# Patient Record
Sex: MALE | Race: WHITE | NOT HISPANIC OR LATINO | Employment: UNEMPLOYED | URBAN - METROPOLITAN AREA
[De-identification: names, ages, dates, MRNs, and addresses within clinical notes are randomized per-mention and may not be internally consistent; named-entity substitution may affect disease eponyms.]

---

## 2022-01-15 ENCOUNTER — OFFICE VISIT (OUTPATIENT)
Dept: URGENT CARE | Facility: PHYSICIAN GROUP | Age: 37
End: 2022-01-15

## 2022-01-15 VITALS
HEART RATE: 77 BPM | HEIGHT: 66 IN | BODY MASS INDEX: 27.29 KG/M2 | RESPIRATION RATE: 20 BRPM | DIASTOLIC BLOOD PRESSURE: 70 MMHG | WEIGHT: 169.8 LBS | TEMPERATURE: 98.1 F | SYSTOLIC BLOOD PRESSURE: 122 MMHG | OXYGEN SATURATION: 96 %

## 2022-01-15 DIAGNOSIS — N50.811 PAIN IN BOTH TESTICLES: ICD-10-CM

## 2022-01-15 DIAGNOSIS — N50.812 PAIN IN BOTH TESTICLES: ICD-10-CM

## 2022-01-15 LAB
APPEARANCE UR: CLEAR
BILIRUB UR STRIP-MCNC: NEGATIVE MG/DL
COLOR UR AUTO: NORMAL
GLUCOSE UR STRIP.AUTO-MCNC: 100 MG/DL
KETONES UR STRIP.AUTO-MCNC: 15 MG/DL
LEUKOCYTE ESTERASE UR QL STRIP.AUTO: NEGATIVE
NITRITE UR QL STRIP.AUTO: POSITIVE
PH UR STRIP.AUTO: 5 [PH] (ref 5–8)
PROT UR QL STRIP: 30 MG/DL
RBC UR QL AUTO: NORMAL
SP GR UR STRIP.AUTO: 1.03
UROBILINOGEN UR STRIP-MCNC: 1 MG/DL

## 2022-01-15 PROCEDURE — 99203 OFFICE O/P NEW LOW 30 MIN: CPT | Performed by: PHYSICIAN ASSISTANT

## 2022-01-15 PROCEDURE — 81002 URINALYSIS NONAUTO W/O SCOPE: CPT | Performed by: PHYSICIAN ASSISTANT

## 2022-01-16 ASSESSMENT — ENCOUNTER SYMPTOMS
EYE PAIN: 0
COUGH: 0
NAUSEA: 0
CHILLS: 0
CONSTIPATION: 0
DIARRHEA: 0
HEADACHES: 0
SORE THROAT: 0
SHORTNESS OF BREATH: 0
VOMITING: 0
MYALGIAS: 0
ABDOMINAL PAIN: 0
FEVER: 0

## 2022-01-16 NOTE — PROGRESS NOTES
"Subjective:   Jorge Spears is a 36 y.o. male who presents for Pain (testicle x3 weeks)      This is a 36-year-old male who reports that around 3 weeks ago he was seen in outside clinic and was diagnosed with a urinary tract infection.  At that time was experiencing dysuria, frequency and urgency.  He was not experiencing any penile discharge.  Unclear whether STD testing was performed at that time.  He was treated with ciprofloxacin twice daily for 7 days and had improvement of his lower urinary symptoms.  However his very subtle testicular pain/scrotal pain has increased over the last 3 weeks.  Seems to be worse after running and brisk physical activity.  Occasionally is left-sided, occasionally right-sided.  He is also noticed waxing and waning scrotal swelling.  He is not experiencing gastrointestinal symptoms.  He has not noted any fever or chills.  As before no rash, lesions, penile discharge.  No concerns of STDs.      Review of Systems   Constitutional: Negative for chills and fever.   HENT: Negative for congestion, ear pain and sore throat.    Eyes: Negative for pain.   Respiratory: Negative for cough and shortness of breath.    Cardiovascular: Negative for chest pain.   Gastrointestinal: Negative for abdominal pain, constipation, diarrhea, nausea and vomiting.   Genitourinary: Negative for dysuria.   Musculoskeletal: Negative for myalgias.   Skin: Negative for rash.   Neurological: Negative for headaches.       Medications, Allergies, and current problem list reviewed today in Epic.     Objective:     /70 (BP Location: Left arm, Patient Position: Sitting)   Pulse 77   Temp 36.7 °C (98.1 °F) (Temporal)   Resp 20   Ht 1.676 m (5' 6\")   Wt 77 kg (169 lb 12.8 oz)   SpO2 96%     Physical Exam  Vitals reviewed.   Constitutional:       Appearance: Normal appearance.   HENT:      Head: Normocephalic and atraumatic.      Right Ear: External ear normal.      Left Ear: External ear normal.      Nose: Nose " normal.      Mouth/Throat:      Mouth: Mucous membranes are moist.   Eyes:      Conjunctiva/sclera: Conjunctivae normal.   Cardiovascular:      Rate and Rhythm: Normal rate.   Pulmonary:      Effort: Pulmonary effort is normal.   Genitourinary:     Comments: Uncircumcised male, no rash, lesions or lymphadenopathy.  There is mild edema of the left scrotum with palpable hydrocele versus varicocele versus swollen epididymis behind the left testicle.  Right testicle unremarkable, no testicular mass.  No inguinal pulse with Valsalva.  Cremasteric reflex intact  Skin:     General: Skin is warm and dry.      Capillary Refill: Capillary refill takes less than 2 seconds.   Neurological:      Mental Status: He is alert and oriented to person, place, and time.         Assessment/Plan:     Diagnosis and associated orders:     1. Pain in both testicles  POCT Urinalysis      Comments/MDM:     • Urinalysis consistent with Azo use.  I offered the patient ultrasound to further identify etiology as I have a strong suspicion for epididymitis, hydrocele, varicocele, or similar etiology.  Cannot rule out torsion but the patient does have cremasteric reflex intact and his mild symptoms oscillating between bilateral testicles for the last 3 weeks would suggest against torsion etiology.  Due to financial concerns patient declined ultrasound testing as well as further lab work.  I discussed the broad differential and I cannot rule out a more serious etiology.  I discussed with him treatment of presumed epididymitis with more scrotal supportive undergarments, anti-inflammatories, avoid jostling, running, or brisk activities.  Patient is instructed to follow-up with his primary fusion of showing slow improvement over the next several days         Differential diagnosis, natural history, supportive care, and indications for immediate follow-up discussed.    Advised the patient to follow-up with the primary care physician for recheck,  reevaluation, and consideration of further management.    Please note that this dictation was created using voice recognition software. I have made a reasonable attempt to correct obvious errors, but I expect that there are errors of grammar and possibly content that I did not discover before finalizing the note.    This note was electronically signed by Daniel Mello PA-C